# Patient Record
Sex: FEMALE | Race: WHITE | NOT HISPANIC OR LATINO
[De-identification: names, ages, dates, MRNs, and addresses within clinical notes are randomized per-mention and may not be internally consistent; named-entity substitution may affect disease eponyms.]

---

## 2020-03-30 ENCOUNTER — APPOINTMENT (OUTPATIENT)
Dept: RHEUMATOLOGY | Facility: CLINIC | Age: 55
End: 2020-03-30

## 2020-07-10 PROBLEM — Z00.00 ENCOUNTER FOR PREVENTIVE HEALTH EXAMINATION: Status: ACTIVE | Noted: 2020-07-10

## 2020-09-14 ENCOUNTER — LABORATORY RESULT (OUTPATIENT)
Age: 55
End: 2020-09-14

## 2020-09-14 ENCOUNTER — APPOINTMENT (OUTPATIENT)
Dept: RHEUMATOLOGY | Facility: CLINIC | Age: 55
End: 2020-09-14
Payer: COMMERCIAL

## 2020-09-14 VITALS
TEMPERATURE: 98 F | HEIGHT: 63 IN | HEART RATE: 88 BPM | RESPIRATION RATE: 16 BRPM | WEIGHT: 216 LBS | DIASTOLIC BLOOD PRESSURE: 82 MMHG | BODY MASS INDEX: 38.27 KG/M2 | SYSTOLIC BLOOD PRESSURE: 121 MMHG

## 2020-09-14 DIAGNOSIS — Z82.0 FAMILY HISTORY OF EPILEPSY AND OTHER DISEASES OF THE NERVOUS SYSTEM: ICD-10-CM

## 2020-09-14 DIAGNOSIS — Z86.79 PERSONAL HISTORY OF OTHER DISEASES OF THE CIRCULATORY SYSTEM: ICD-10-CM

## 2020-09-14 DIAGNOSIS — M34.9 SYSTEMIC SCLEROSIS, UNSPECIFIED: ICD-10-CM

## 2020-09-14 DIAGNOSIS — Z82.49 FAMILY HISTORY OF ISCHEMIC HEART DISEASE AND OTHER DISEASES OF THE CIRCULATORY SYSTEM: ICD-10-CM

## 2020-09-14 DIAGNOSIS — Z83.2 FAMILY HISTORY OF DISEASES OF THE BLOOD AND BLOOD-FORMING ORGANS AND CERTAIN DISORDERS INVOLVING THE IMMUNE MECHANISM: ICD-10-CM

## 2020-09-14 DIAGNOSIS — Z80.7 FAMILY HISTORY OF OTHER MALIGNANT NEOPLASMS OF LYMPHOID, HEMATOPOIETIC AND RELATED TISSUES: ICD-10-CM

## 2020-09-14 DIAGNOSIS — K57.90 DIVERTICULOSIS OF INTESTINE, PART UNSPECIFIED, W/OUT PERFORATION OR ABSCESS W/OUT BLEEDING: ICD-10-CM

## 2020-09-14 DIAGNOSIS — Z78.9 OTHER SPECIFIED HEALTH STATUS: ICD-10-CM

## 2020-09-14 DIAGNOSIS — Z83.3 FAMILY HISTORY OF DIABETES MELLITUS: ICD-10-CM

## 2020-09-14 DIAGNOSIS — L02.214 CUTANEOUS ABSCESS OF GROIN: ICD-10-CM

## 2020-09-14 PROCEDURE — 99244 OFF/OP CNSLTJ NEW/EST MOD 40: CPT

## 2020-09-14 RX ORDER — BACILLUS COAGULANS/INULIN 1B-250 MG
CAPSULE ORAL
Refills: 0 | Status: ACTIVE | COMMUNITY

## 2020-09-14 RX ORDER — MULTIVITAMIN
TABLET ORAL
Refills: 0 | Status: ACTIVE | COMMUNITY

## 2020-09-14 RX ORDER — OMEGA-3/DHA/EPA/FISH OIL 300-1000MG
CAPSULE ORAL
Refills: 0 | Status: ACTIVE | COMMUNITY

## 2020-09-14 RX ORDER — CHOLECALCIFEROL (VITAMIN D3) 25 MCG
TABLET ORAL
Refills: 0 | Status: ACTIVE | COMMUNITY

## 2020-09-14 RX ORDER — VIT C/HESPERIDIN/BIOFLAVONOIDS 500-100 MG
30 TABLET ORAL
Refills: 0 | Status: ACTIVE | COMMUNITY

## 2020-09-14 RX ORDER — ASCORBIC ACID 500 MG
TABLET ORAL
Refills: 0 | Status: ACTIVE | COMMUNITY

## 2020-09-15 ENCOUNTER — LABORATORY RESULT (OUTPATIENT)
Age: 55
End: 2020-09-15

## 2020-09-17 LAB
25(OH)D3 SERPL-MCNC: 53.7 NG/ML
ALBUMIN MFR SERPL ELPH: 57.8 %
ALBUMIN SERPL ELPH-MCNC: 4.6 G/DL
ALBUMIN SERPL-MCNC: 4.2 G/DL
ALBUMIN/GLOB SERPL: 1.4 RATIO
ALP BLD-CCNC: 106 U/L
ALPHA1 GLOB MFR SERPL ELPH: 4 %
ALPHA1 GLOB SERPL ELPH-MCNC: 0.3 G/DL
ALPHA2 GLOB MFR SERPL ELPH: 10.4 %
ALPHA2 GLOB SERPL ELPH-MCNC: 0.8 G/DL
ALT SERPL-CCNC: 24 U/L
ANA PAT FLD IF-IMP: ABNORMAL
ANA SER IF-ACNC: ABNORMAL
ANION GAP SERPL CALC-SCNC: 17 MMOL/L
APPEARANCE: CLEAR
AST SERPL-CCNC: 23 U/L
B-GLOBULIN MFR SERPL ELPH: 11.6 %
B-GLOBULIN SERPL ELPH-MCNC: 0.8 G/DL
BASOPHILS # BLD AUTO: 0.05 K/UL
BASOPHILS NFR BLD AUTO: 0.7 %
BILIRUB SERPL-MCNC: 0.4 MG/DL
BILIRUBIN URINE: NEGATIVE
BLOOD URINE: NEGATIVE
BUN SERPL-MCNC: 17 MG/DL
C3 SERPL-MCNC: 127 MG/DL
C4 SERPL-MCNC: 35 MG/DL
CALCIUM SERPL-MCNC: 9.8 MG/DL
CCP AB SER IA-ACNC: 27 UNITS
CHLORIDE SERPL-SCNC: 105 MMOL/L
CK SERPL-CCNC: 51 U/L
CO2 SERPL-SCNC: 25 MMOL/L
COLOR: YELLOW
CREAT SERPL-MCNC: 0.71 MG/DL
CREAT SPEC-SCNC: 108 MG/DL
CREAT/PROT UR: 0.1 RATIO
DEPRECATED KAPPA LC FREE/LAMBDA SER: 1.15 RATIO
DSDNA AB SER-ACNC: <12 IU/ML
ENA RNP AB SER IA-ACNC: 0.2 AL
ENA SCL70 IGG SER IA-ACNC: <0.2 AL
ENA SM AB SER IA-ACNC: <0.2 AL
ENA SS-A AB SER IA-ACNC: 0.7 AL
ENA SS-B AB SER IA-ACNC: <0.2 AL
EOSINOPHIL # BLD AUTO: 0.23 K/UL
EOSINOPHIL NFR BLD AUTO: 3.3 %
FERRITIN SERPL-MCNC: 42 NG/ML
GAMMA GLOB FLD ELPH-MCNC: 1.2 G/DL
GAMMA GLOB MFR SERPL ELPH: 16.2 %
GLUCOSE QUALITATIVE U: NEGATIVE
GLUCOSE SERPL-MCNC: 87 MG/DL
HAV IGM SER QL: NONREACTIVE
HBV CORE IGM SER QL: NONREACTIVE
HBV SURFACE AG SER QL: NONREACTIVE
HCT VFR BLD CALC: 51.3 %
HCV AB SER QL: ABNORMAL
HCV S/CO RATIO: 2.3 S/CO
HGB BLD-MCNC: 15.2 G/DL
IGA SER QL IEP: 199 MG/DL
IGG SER QL IEP: 1082 MG/DL
IGM SER QL IEP: 111 MG/DL
IMM GRANULOCYTES NFR BLD AUTO: 0.1 %
INTERPRETATION SERPL IEP-IMP: NORMAL
KAPPA LC CSF-MCNC: 1.46 MG/DL
KAPPA LC SERPL-MCNC: 1.68 MG/DL
KETONES URINE: NEGATIVE
LEUKOCYTE ESTERASE URINE: ABNORMAL
LYMPHOCYTES # BLD AUTO: 2.92 K/UL
LYMPHOCYTES NFR BLD AUTO: 41.8 %
M TB IFN-G BLD-IMP: NEGATIVE
MAN DIFF?: NORMAL
MCHC RBC-ENTMCNC: 27.4 PG
MCHC RBC-ENTMCNC: 29.6 GM/DL
MCV RBC AUTO: 92.6 FL
MONOCYTES # BLD AUTO: 0.49 K/UL
MONOCYTES NFR BLD AUTO: 7 %
MPO AB + PR3 PNL SER: NORMAL
NEUTROPHILS # BLD AUTO: 3.28 K/UL
NEUTROPHILS NFR BLD AUTO: 47.1 %
NITRITE URINE: NEGATIVE
PH URINE: 6
PLATELET # BLD AUTO: 307 K/UL
POTASSIUM SERPL-SCNC: 4.4 MMOL/L
PROT SERPL-MCNC: 7.3 G/DL
PROT UR-MCNC: 10 MG/DL
PROTEIN URINE: NEGATIVE
QUANTIFERON TB PLUS MITOGEN MINUS NIL: 6.74 IU/ML
QUANTIFERON TB PLUS NIL: 0.02 IU/ML
QUANTIFERON TB PLUS TB1 MINUS NIL: 0.01 IU/ML
QUANTIFERON TB PLUS TB2 MINUS NIL: 0.01 IU/ML
RBC # BLD: 5.54 M/UL
RBC # FLD: 15.5 %
RF+CCP IGG SER-IMP: ABNORMAL
RHEUMATOID FACT SER QL: 43 IU/ML
SODIUM SERPL-SCNC: 146 MMOL/L
SPECIFIC GRAVITY URINE: 1.02
THYROGLOB AB SERPL-ACNC: <20 IU/ML
THYROPEROXIDASE AB SERPL IA-ACNC: <10 IU/ML
URATE SERPL-MCNC: 4.6 MG/DL
UROBILINOGEN URINE: NORMAL
WBC # FLD AUTO: 6.98 K/UL

## 2020-09-18 LAB — RNA POLYMERASE III IGG: 9 UNITS

## 2020-11-30 ENCOUNTER — TRANSCRIPTION ENCOUNTER (OUTPATIENT)
Age: 55
End: 2020-11-30

## 2020-12-31 ENCOUNTER — TRANSCRIPTION ENCOUNTER (OUTPATIENT)
Age: 55
End: 2020-12-31

## 2021-05-03 ENCOUNTER — TRANSCRIPTION ENCOUNTER (OUTPATIENT)
Age: 56
End: 2021-05-03

## 2022-06-14 ENCOUNTER — LABORATORY RESULT (OUTPATIENT)
Age: 57
End: 2022-06-14

## 2022-06-14 ENCOUNTER — APPOINTMENT (OUTPATIENT)
Dept: RHEUMATOLOGY | Facility: CLINIC | Age: 57
End: 2022-06-14

## 2022-06-14 VITALS
TEMPERATURE: 97.5 F | BODY MASS INDEX: 38.98 KG/M2 | SYSTOLIC BLOOD PRESSURE: 140 MMHG | DIASTOLIC BLOOD PRESSURE: 87 MMHG | HEART RATE: 87 BPM | RESPIRATION RATE: 15 BRPM | HEIGHT: 63 IN | WEIGHT: 220 LBS

## 2022-06-14 PROCEDURE — 99214 OFFICE O/P EST MOD 30 MIN: CPT | Mod: GC

## 2022-06-14 RX ORDER — FLUTICASONE PROPIONATE 50 UG/1
50 SPRAY, METERED NASAL
Qty: 16 | Refills: 0 | Status: ACTIVE | COMMUNITY
Start: 2022-03-17

## 2022-06-14 RX ORDER — BROMPHENIRAMINE MALEATE, PSEUDOEPHEDRINE HYDROCHLORIDE, 2; 30; 10 MG/5ML; MG/5ML; MG/5ML
30-2-10 SYRUP ORAL
Qty: 200 | Refills: 0 | Status: ACTIVE | COMMUNITY
Start: 2022-05-27

## 2022-06-14 RX ORDER — DOXYCYCLINE HYCLATE 100 MG/1
100 CAPSULE ORAL
Qty: 14 | Refills: 0 | Status: DISCONTINUED | COMMUNITY
Start: 2022-03-17 | End: 2022-06-14

## 2022-06-14 RX ORDER — MUPIROCIN 20 MG/G
2 OINTMENT TOPICAL
Qty: 22 | Refills: 0 | Status: ACTIVE | COMMUNITY
Start: 2022-03-24

## 2022-06-14 RX ORDER — LATANOPROST/PF 0.005 %
0.01 DROPS OPHTHALMIC (EYE)
Qty: 8 | Refills: 0 | Status: ACTIVE | COMMUNITY
Start: 2022-03-31

## 2022-06-14 RX ORDER — PRAVASTATIN SODIUM 10 MG/1
10 TABLET ORAL
Qty: 90 | Refills: 0 | Status: ACTIVE | COMMUNITY
Start: 2022-05-14

## 2022-06-14 RX ORDER — AMLODIPINE BESYLATE 5 MG/1
TABLET ORAL
Refills: 0 | Status: DISCONTINUED | COMMUNITY
End: 2022-06-14

## 2022-06-23 ENCOUNTER — APPOINTMENT (OUTPATIENT)
Dept: CT IMAGING | Facility: CLINIC | Age: 57
End: 2022-06-23

## 2022-06-23 ENCOUNTER — OUTPATIENT (OUTPATIENT)
Dept: OUTPATIENT SERVICES | Facility: HOSPITAL | Age: 57
LOS: 1 days | End: 2022-06-23
Payer: COMMERCIAL

## 2022-06-23 DIAGNOSIS — M34.9 SYSTEMIC SCLEROSIS, UNSPECIFIED: ICD-10-CM

## 2022-06-23 PROCEDURE — 71250 CT THORAX DX C-: CPT

## 2022-06-23 PROCEDURE — 71250 CT THORAX DX C-: CPT | Mod: 26

## 2022-06-24 LAB
ALBUMIN SERPL ELPH-MCNC: 4.5 G/DL
ALP BLD-CCNC: 107 U/L
ALT SERPL-CCNC: 20 U/L
ANA PAT FLD IF-IMP: ABNORMAL
ANA SER IF-ACNC: ABNORMAL
ANION GAP SERPL CALC-SCNC: 15 MMOL/L
APPEARANCE: CLEAR
AST SERPL-CCNC: 21 U/L
BACTERIA: NEGATIVE
BASOPHILS # BLD AUTO: 0.03 K/UL
BASOPHILS NFR BLD AUTO: 0.5 %
BILIRUB SERPL-MCNC: 0.6 MG/DL
BILIRUBIN URINE: NEGATIVE
BLOOD URINE: NEGATIVE
BUN SERPL-MCNC: 11 MG/DL
C3 SERPL-MCNC: 146 MG/DL
C4 SERPL-MCNC: 39 MG/DL
CALCIUM SERPL-MCNC: 10.2 MG/DL
CCP AB SER IA-ACNC: 17 UNITS
CENTROMERE IGG SER-ACNC: >8 CD:130001892
CHLORIDE SERPL-SCNC: 104 MMOL/L
CO2 SERPL-SCNC: 23 MMOL/L
COLOR: NORMAL
CREAT SERPL-MCNC: 0.71 MG/DL
CREAT SPEC-SCNC: 76 MG/DL
CREAT/PROT UR: 0.1 RATIO
CRP SERPL-MCNC: 4 MG/L
DIRECT COOMBS: NORMAL
DSDNA AB SER-ACNC: 13 IU/ML
EGFR: 100 ML/MIN/1.73M2
ENA RNP AB SER IA-ACNC: 0.2 AL
ENA SCL70 IGG SER IA-ACNC: <0.2 AL
ENA SM AB SER IA-ACNC: <0.2 AL
ENA SS-A AB SER IA-ACNC: 0.7 AL
ENA SS-B AB SER IA-ACNC: <0.2 AL
EOSINOPHIL # BLD AUTO: 0.2 K/UL
EOSINOPHIL NFR BLD AUTO: 3.2 %
ERYTHROCYTE [SEDIMENTATION RATE] IN BLOOD BY WESTERGREN METHOD: 49 MM/HR
GLUCOSE QUALITATIVE U: NEGATIVE
GLUCOSE SERPL-MCNC: 101 MG/DL
HAPTOGLOB SERPL-MCNC: 191 MG/DL
HCT VFR BLD CALC: 50 %
HCV AB SER QL: ABNORMAL
HCV S/CO RATIO: 1.51 S/CO
HGB BLD-MCNC: 15.6 G/DL
HYALINE CASTS: 0 /LPF
IMM GRANULOCYTES NFR BLD AUTO: 0 %
KETONES URINE: NEGATIVE
LDH SERPL-CCNC: 189 U/L
LEUKOCYTE ESTERASE URINE: NEGATIVE
LYMPHOCYTES # BLD AUTO: 2.31 K/UL
LYMPHOCYTES NFR BLD AUTO: 36.7 %
MAN DIFF?: NORMAL
MCHC RBC-ENTMCNC: 27.2 PG
MCHC RBC-ENTMCNC: 31.2 GM/DL
MCV RBC AUTO: 87.1 FL
MICROSCOPIC-UA: NORMAL
MONOCYTES # BLD AUTO: 0.41 K/UL
MONOCYTES NFR BLD AUTO: 6.5 %
NEUTROPHILS # BLD AUTO: 3.34 K/UL
NEUTROPHILS NFR BLD AUTO: 53.1 %
NITRITE URINE: NEGATIVE
PH URINE: 7
PLATELET # BLD AUTO: 252 K/UL
POTASSIUM SERPL-SCNC: 4.6 MMOL/L
PROT SERPL-MCNC: 7.5 G/DL
PROT UR-MCNC: 9 MG/DL
PROTEIN URINE: NEGATIVE
RBC # BLD: 5.74 M/UL
RBC # BLD: 5.74 M/UL
RBC # FLD: 17.4 %
RED BLOOD CELLS URINE: 1 /HPF
RETICS # AUTO: 1.6 %
RETICS AGGREG/RBC NFR: 91.8 K/UL
RF+CCP IGG SER-IMP: NEGATIVE
RHEUMATOID FACT SER QL: 49 IU/ML
RNA POLYMERASE III IGG: 22 UNITS
SODIUM SERPL-SCNC: 142 MMOL/L
SPECIFIC GRAVITY URINE: 1.01
SQUAMOUS EPITHELIAL CELLS: 20 /HPF
UROBILINOGEN URINE: NORMAL
WBC # FLD AUTO: 6.29 K/UL
WHITE BLOOD CELLS URINE: 4 /HPF

## 2022-06-27 ENCOUNTER — TRANSCRIPTION ENCOUNTER (OUTPATIENT)
Age: 57
End: 2022-06-27

## 2022-11-12 ENCOUNTER — OFFICE (OUTPATIENT)
Dept: URBAN - METROPOLITAN AREA CLINIC 93 | Facility: CLINIC | Age: 57
Setting detail: OPHTHALMOLOGY
End: 2022-11-12
Payer: COMMERCIAL

## 2022-11-12 DIAGNOSIS — H47.333: ICD-10-CM

## 2022-11-12 DIAGNOSIS — H53.433: ICD-10-CM

## 2022-11-12 DIAGNOSIS — H35.363: ICD-10-CM

## 2022-11-12 DIAGNOSIS — H50.52: ICD-10-CM

## 2022-11-12 DIAGNOSIS — H25.13: ICD-10-CM

## 2022-11-12 DIAGNOSIS — Z79.899: ICD-10-CM

## 2022-11-12 DIAGNOSIS — H35.033: ICD-10-CM

## 2022-11-12 DIAGNOSIS — H01.001: ICD-10-CM

## 2022-11-12 DIAGNOSIS — H01.004: ICD-10-CM

## 2022-11-12 DIAGNOSIS — H40.053: ICD-10-CM

## 2022-11-12 DIAGNOSIS — H50.21: ICD-10-CM

## 2022-11-12 DIAGNOSIS — H53.2: ICD-10-CM

## 2022-11-12 PROCEDURE — 92201 OPSCPY EXTND RTA DRAW UNI/BI: CPT | Performed by: OPHTHALMOLOGY

## 2022-11-12 PROCEDURE — 92133 CPTRZD OPH DX IMG PST SGM ON: CPT | Performed by: OPHTHALMOLOGY

## 2022-11-12 PROCEDURE — 92014 COMPRE OPH EXAM EST PT 1/>: CPT | Performed by: OPHTHALMOLOGY

## 2022-11-12 PROCEDURE — 92083 EXTENDED VISUAL FIELD XM: CPT | Performed by: OPHTHALMOLOGY

## 2022-11-12 ASSESSMENT — REFRACTION_CURRENTRX
OD_CYLINDER: -1.25
OS_VPRISM_DIRECTION: SV
OD_AXIS: 087
OD_OVR_VA: 20/
OD_VPRISM_DIRECTION: SV
OD_SPHERE: -0.75
OS_OVR_VA: 20/
OS_VPRISM_DIRECTION: SV
OS_AXIS: 065
OD_OVR_VA: 20/
OS_AXIS: 063
OS_SPHERE: +2.25
OD_VPRISM_DIRECTION: SV
OS_CYLINDER: -0.75
OS_OVR_VA: 20/
OD_SPHERE: +2.00
OS_SPHERE: -0.25
OD_AXIS: 083
OD_CYLINDER: -1.25
OS_CYLINDER: -0.75

## 2022-11-12 ASSESSMENT — SPHEQUIV_DERIVED
OD_SPHEQUIV: -0.875
OS_SPHEQUIV: -0.75
OS_SPHEQUIV: -0.375
OS_SPHEQUIV: 0.375
OD_SPHEQUIV: -0.75
OD_SPHEQUIV: 0

## 2022-11-12 ASSESSMENT — KERATOMETRY
OS_K1POWER_DIOPTERS: 45.50
OS_K2POWER_DIOPTERS: 44.50
METHOD_AUTO_MANUAL: AUTO
OD_K2POWER_DIOPTERS: 45.00
OD_K1POWER_DIOPTERS: 46.00
OD_AXISANGLE_DEGREES: 084
OS_AXISANGLE_DEGREES: 060

## 2022-11-12 ASSESSMENT — REFRACTION_MANIFEST
OS_CYLINDER: -1.00
OS_SPHERE: -0.25
OS_ADD: +2.00
OS_ADD: +2.50
OD_AXIS: 85
OD_AXIS: 090
OS_VA1: 20/20
OS_CYLINDER: -1.25
OD_SPHERE: +0.25
OS_SPHERE: +0.25
OD_ADD: +2.00
OD_CYLINDER: -1.25
OS_VA1: 20/20
OD_VA1: 20/25+1
OS_AXIS: 060
OD_VA1: 20/20
OS_AXIS: 70
OD_ADD: +2.50
OU_VA: 20/20-
OD_CYLINDER: -2.00
OD_SPHERE: -0.25

## 2022-11-12 ASSESSMENT — AXIALLENGTH_DERIVED
OD_AL: 23.1576
OS_AL: 23.336
OD_AL: 22.8795
OS_AL: 23.1939
OD_AL: 23.2047
OS_AL: 22.9148

## 2022-11-12 ASSESSMENT — REFRACTION_AUTOREFRACTION
OS_SPHERE: +1.00
OD_AXIS: 081
OD_SPHERE: +1.25
OS_CYLINDER: -1.25
OS_AXIS: 072
OD_CYLINDER: -2.50

## 2022-11-12 ASSESSMENT — VISUAL ACUITY
OD_BCVA: 20/25+1
OS_BCVA: 20/30-2

## 2022-11-12 ASSESSMENT — CONFRONTATIONAL VISUAL FIELD TEST (CVF)
OD_FINDINGS: FULL
OS_FINDINGS: FULL

## 2022-11-12 ASSESSMENT — SUPERFICIAL PUNCTATE KERATITIS (SPK)
OS_SPK: T
OD_SPK: T

## 2022-11-12 ASSESSMENT — LID EXAM ASSESSMENTS
OS_BLEPHARITIS: LUL T
OD_BLEPHARITIS: RUL T

## 2023-07-25 ENCOUNTER — LABORATORY RESULT (OUTPATIENT)
Age: 58
End: 2023-07-25

## 2023-07-25 ENCOUNTER — APPOINTMENT (OUTPATIENT)
Dept: RHEUMATOLOGY | Facility: CLINIC | Age: 58
End: 2023-07-25
Payer: COMMERCIAL

## 2023-07-25 VITALS
OXYGEN SATURATION: 98 % | RESPIRATION RATE: 16 BRPM | HEIGHT: 63 IN | TEMPERATURE: 97.1 F | WEIGHT: 250 LBS | HEART RATE: 98 BPM | SYSTOLIC BLOOD PRESSURE: 143 MMHG | BODY MASS INDEX: 44.3 KG/M2 | DIASTOLIC BLOOD PRESSURE: 86 MMHG

## 2023-07-25 PROCEDURE — 99214 OFFICE O/P EST MOD 30 MIN: CPT

## 2023-07-25 NOTE — PHYSICAL EXAM
[General Appearance - Alert] : alert [General Appearance - In No Acute Distress] : in no acute distress [Sclera] : the sclera and conjunctiva were normal [PERRL With Normal Accommodation] : pupils were equal in size, round, and reactive to light [Extraocular Movements] : extraocular movements were intact [Outer Ear] : the ears and nose were normal in appearance [Oropharynx] : the oropharynx was normal [Auscultation Breath Sounds / Voice Sounds] : lungs were clear to auscultation bilaterally [Heart Rate And Rhythm] : heart rate was normal and rhythm regular [Heart Sounds] : normal S1 and S2 [Heart Sounds Gallop] : no gallops [Murmurs] : no murmurs [Heart Sounds Pericardial Friction Rub] : no pericardial rub [Bowel Sounds] : normal bowel sounds [Abdomen Soft] : soft [Abdomen Tenderness] : non-tender [] : no hepato-splenomegaly [Abdomen Mass (___ Cm)] : no abdominal mass palpated [Cervical Lymph Nodes Enlarged Posterior Bilaterally] : posterior cervical [Cervical Lymph Nodes Enlarged Anterior Bilaterally] : anterior cervical [Supraclavicular Lymph Nodes Enlarged Bilaterally] : supraclavicular [Axillary Lymph Nodes Enlarged Bilaterally] : axillary [FreeTextEntry1] : Right 2nd finger digital tip ulcer has healed. 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 Telangiectasis in lower lip.

## 2023-07-25 NOTE — ASSESSMENT
[FreeTextEntry1] : 58 year old female with limited systemic sclerosis here for follow up. \par \par 1. Limited systemic sclerosis: limited skin tightening, arthralgias/myopathy, Raynaud's with digital ulcer, GERD\par i) Skin: has limited skin tightening (sclerodactyly) that is relatively stable. Monitor for now. Raynaud's reasonably controlled with Nifedipine 60 mg daily. Using Nitro bid sparingly. \par ii) Pulmonary: CT chest last year w/o evidence of ILD. Has appt. with pulm. Will do PFTs then.   \par iii) Cardiac: ECHO was recently done with Cardiologist at [place], Has f/u appointment scheduled. Advised to bring records.. \par iv) MSK: Known OA of the knees and feet. No joint symptoms suggestive of RA although has pos RF and +/- CCP. Discussed possible Plaquenil trial but patient wants to wait for now. \par v) GI: no active issues \par vi) Renal: No h/o renal involvement.  \par vii) Heme: no active issues. \par viii) Neuro: no active issues\par iX) Vascular: h/o venus insufficiency. Follows with vascular surgery at [place]\par \par 2. Bone health: DEXA to be ordered at next visit - discussed with patient. \par \par Follow up in 3 months \par \par

## 2023-07-25 NOTE — REVIEW OF SYSTEMS
[Eyesight Problems] : eyesight problems [Fever] : no fever [Chills] : no chills [Earache] : no earache [Shortness Of Breath] : no shortness of breath [Abdominal Pain] : no abdominal pain [Vomiting] : no vomiting [Diarrhea] : no diarrhea [Joint Pain] : no joint pain [Joint Stiffness] : no joint stiffness [Muscle Weakness] : no muscle weakness [Swollen Glands] : no swollen glands

## 2023-07-25 NOTE — HISTORY OF PRESENT ILLNESS
[___ Month(s) Ago] : [unfilled] month(s) ago [Currently Experiencing] : currently [Fatigue] : fatigue [Dry Mouth] : dry mouth [Arthralgias] : arthralgias [Decreased ROM] : decreased range of motion [Myalgias] : myalgias [FreeTextEntry1] : C/o ongoing fatigue and arthralgias in the knees and feet. Also sicca symptoms. No cough, SOB. L LE swelling on and off due to venous insufficiency.\par \par No new digital ulcers. Raynaud's did improve with Nifedipine but still notices some discoloration of the fingers with cold exposure.  [Anorexia] : no anorexia [Weight Loss] : no weight loss [Malaise] : no malaise [Fever] : no fever [Chills] : no chills [Depression] : no depression [Malar Facial Rash] : no malar facial rash [Skin Lesions] : no lesions [Skin Nodules] : no skin nodules [Oral Ulcers] : no oral ulcers [Cough] : no cough [Dysphonia] : no dysphonia [Dysphagia] : no dysphagia [Shortness of Breath] : no shortness of breath [Chest Pain] : no chest pain [Difficulty Standing] : no difficulty standing [Difficulty Walking] : no difficulty walking [Dyspnea] : no dyspnea [Muscle Weakness] : no muscle weakness [Muscle Spasms] : no muscle spasms [Muscle Cramping] : no muscle cramping [Visual Changes] : no visual changes [Eye Pain] : no eye pain [Eye Redness] : no eye redness [Dry Eyes] : no dry eyes

## 2023-08-04 LAB
ALBUMIN SERPL ELPH-MCNC: 4.6 G/DL
ALP BLD-CCNC: 102 U/L
ALT SERPL-CCNC: 32 U/L
ANION GAP SERPL CALC-SCNC: 18 MMOL/L
APPEARANCE: CLEAR
AST SERPL-CCNC: 35 U/L
BILIRUB SERPL-MCNC: 0.6 MG/DL
BILIRUBIN URINE: NEGATIVE
BLOOD URINE: NEGATIVE
BUN SERPL-MCNC: 15 MG/DL
CALCIUM SERPL-MCNC: 9.8 MG/DL
CHLORIDE SERPL-SCNC: 105 MMOL/L
CK SERPL-CCNC: 69 U/L
CO2 SERPL-SCNC: 22 MMOL/L
COLOR: NORMAL
CREAT SERPL-MCNC: 0.81 MG/DL
CREAT SPEC-SCNC: 145 MG/DL
CREAT/PROT UR: 0.1 RATIO
CRP SERPL-MCNC: 4 MG/L
DEPRECATED KAPPA LC FREE/LAMBDA SER: 1.18 RATIO
EGFR: 85 ML/MIN/1.73M2
ERYTHROCYTE [SEDIMENTATION RATE] IN BLOOD BY WESTERGREN METHOD: 41 MM/HR
GLUCOSE QUALITATIVE U: NEGATIVE MG/DL
GLUCOSE SERPL-MCNC: 100 MG/DL
IGA SER QL IEP: 235 MG/DL
IGG SER QL IEP: 1085 MG/DL
IGM SER QL IEP: 109 MG/DL
KAPPA LC CSF-MCNC: 1.85 MG/DL
KAPPA LC SERPL-MCNC: 2.18 MG/DL
KETONES URINE: NEGATIVE MG/DL
LEUKOCYTE ESTERASE URINE: ABNORMAL
NITRITE URINE: NEGATIVE
PH URINE: 6
POTASSIUM SERPL-SCNC: 4.7 MMOL/L
PROT SERPL-MCNC: 7.2 G/DL
PROT UR-MCNC: 16 MG/DL
PROTEIN URINE: NEGATIVE MG/DL
RHEUMATOID FACT SER QL: 33 IU/ML
SODIUM SERPL-SCNC: 145 MMOL/L
SPECIFIC GRAVITY URINE: 1.02
TSH SERPL-ACNC: 4.3 UIU/ML
UROBILINOGEN URINE: 0.2 MG/DL

## 2023-09-29 ENCOUNTER — OFFICE (OUTPATIENT)
Dept: URBAN - METROPOLITAN AREA CLINIC 12 | Facility: CLINIC | Age: 58
Setting detail: OPHTHALMOLOGY
End: 2023-09-29
Payer: COMMERCIAL

## 2023-09-29 DIAGNOSIS — H50.21: ICD-10-CM

## 2023-09-29 DIAGNOSIS — H47.333: ICD-10-CM

## 2023-09-29 DIAGNOSIS — H50.52: ICD-10-CM

## 2023-09-29 DIAGNOSIS — H53.2: ICD-10-CM

## 2023-09-29 DIAGNOSIS — H25.13: ICD-10-CM

## 2023-09-29 DIAGNOSIS — H16.223: ICD-10-CM

## 2023-09-29 DIAGNOSIS — H01.001: ICD-10-CM

## 2023-09-29 DIAGNOSIS — Z79.899: ICD-10-CM

## 2023-09-29 DIAGNOSIS — H40.053: ICD-10-CM

## 2023-09-29 DIAGNOSIS — H01.004: ICD-10-CM

## 2023-09-29 DIAGNOSIS — H53.433: ICD-10-CM

## 2023-09-29 PROCEDURE — 99214 OFFICE O/P EST MOD 30 MIN: CPT | Performed by: STUDENT IN AN ORGANIZED HEALTH CARE EDUCATION/TRAINING PROGRAM

## 2023-09-29 PROCEDURE — 92020 GONIOSCOPY: CPT | Performed by: STUDENT IN AN ORGANIZED HEALTH CARE EDUCATION/TRAINING PROGRAM

## 2023-09-29 PROCEDURE — 92083 EXTENDED VISUAL FIELD XM: CPT | Performed by: STUDENT IN AN ORGANIZED HEALTH CARE EDUCATION/TRAINING PROGRAM

## 2023-09-29 PROCEDURE — 92133 CPTRZD OPH DX IMG PST SGM ON: CPT | Performed by: STUDENT IN AN ORGANIZED HEALTH CARE EDUCATION/TRAINING PROGRAM

## 2023-09-29 ASSESSMENT — REFRACTION_MANIFEST
OD_SPHERE: -0.25
OD_VA1: 20/20
OS_CYLINDER: -1.25
OS_SPHERE: +0.25
OS_ADD: +2.50
OS_AXIS: 060
OD_VA1: 20/25+1
OS_CYLINDER: -1.00
OS_VA1: 20/20
OD_CYLINDER: -2.00
OD_AXIS: 090
OS_AXIS: 70
OU_VA: 20/20-
OD_ADD: +2.00
OS_VA1: 20/20
OS_ADD: +2.00
OD_SPHERE: +0.25
OD_ADD: +2.50
OD_CYLINDER: -1.25
OD_AXIS: 85
OS_SPHERE: -0.25

## 2023-09-29 ASSESSMENT — SUPERFICIAL PUNCTATE KERATITIS (SPK)
OD_SPK: T
OS_SPK: T

## 2023-09-29 ASSESSMENT — REFRACTION_CURRENTRX
OD_SPHERE: -0.50
OD_AXIS: 090
OD_VPRISM_DIRECTION: SV
OS_CYLINDER: -1.00
OS_VPRISM_DIRECTION: SV
OS_OVR_VA: 20/
OS_AXIS: 062
OS_CYLINDER: -0.75
OS_OVR_VA: 20/
OD_CYLINDER: -1.25
OD_OVR_VA: 20/
OS_SPHERE: PLANO
OD_OVR_VA: 20/
OD_AXIS: 083
OD_SPHERE: +2.00
OS_AXIS: 059
OD_VPRISM_DIRECTION: SV
OD_CYLINDER: -1.25
OS_SPHERE: +2.25
OS_VPRISM_DIRECTION: SV

## 2023-09-29 ASSESSMENT — CONFRONTATIONAL VISUAL FIELD TEST (CVF)
OS_FINDINGS: FULL
OD_FINDINGS: FULL

## 2023-09-29 ASSESSMENT — AXIALLENGTH_DERIVED
OD_AL: 23.1001
OD_AL: 23.336
OS_AL: 23.202
OD_AL: 23.3837
OS_AL: 22.9227
OS_AL: 23.0615

## 2023-09-29 ASSESSMENT — SPHEQUIV_DERIVED
OD_SPHEQUIV: -0.875
OS_SPHEQUIV: -0.375
OD_SPHEQUIV: -0.75
OS_SPHEQUIV: -0.75
OD_SPHEQUIV: -0.125
OS_SPHEQUIV: 0

## 2023-09-29 ASSESSMENT — KERATOMETRY
OD_K1POWER_DIOPTERS: 44.25
OS_K1POWER_DIOPTERS: 45.25
OD_AXISANGLE_DEGREES: 178
METHOD_AUTO_MANUAL: AUTO
OS_AXISANGLE_DEGREES: 155
OS_K2POWER_DIOPTERS: 45.50
OD_K2POWER_DIOPTERS: 45.75

## 2023-09-29 ASSESSMENT — REFRACTION_AUTOREFRACTION
OS_SPHERE: +0.50
OD_SPHERE: +1.00
OD_CYLINDER: -2.25
OS_AXIS: 069
OS_CYLINDER: -1.00
OD_AXIS: 083

## 2023-09-29 ASSESSMENT — LID EXAM ASSESSMENTS
OS_BLEPHARITIS: LUL T
OD_BLEPHARITIS: RUL T

## 2023-09-29 ASSESSMENT — PACHYMETRY
OD_CT_UM: 559
OS_CT_UM: 552
OS_CT_CORRECTION: -1
OD_CT_CORRECTION: -1

## 2023-09-29 ASSESSMENT — VISUAL ACUITY
OD_BCVA: 20/20
OS_BCVA: 20/30-2

## 2023-11-28 ENCOUNTER — APPOINTMENT (OUTPATIENT)
Dept: PULMONOLOGY | Facility: CLINIC | Age: 58
End: 2023-11-28

## 2023-12-12 ENCOUNTER — APPOINTMENT (OUTPATIENT)
Dept: RHEUMATOLOGY | Facility: CLINIC | Age: 58
End: 2023-12-12

## 2024-01-10 ENCOUNTER — APPOINTMENT (OUTPATIENT)
Dept: RHEUMATOLOGY | Facility: CLINIC | Age: 59
End: 2024-01-10
Payer: COMMERCIAL

## 2024-01-10 VITALS
HEIGHT: 63 IN | OXYGEN SATURATION: 98 % | HEART RATE: 93 BPM | SYSTOLIC BLOOD PRESSURE: 164 MMHG | TEMPERATURE: 98.1 F | WEIGHT: 208 LBS | RESPIRATION RATE: 16 BRPM | DIASTOLIC BLOOD PRESSURE: 96 MMHG | BODY MASS INDEX: 36.86 KG/M2

## 2024-01-10 PROCEDURE — 99214 OFFICE O/P EST MOD 30 MIN: CPT | Mod: GC

## 2024-01-10 RX ORDER — ASPIRIN ENTERIC COATED TABLETS 81 MG 81 MG/1
81 TABLET, DELAYED RELEASE ORAL DAILY
Qty: 90 | Refills: 0 | Status: ACTIVE | COMMUNITY
Start: 2024-01-10 | End: 1900-01-01

## 2024-01-10 RX ORDER — NIFEDIPINE 60 MG/1
60 TABLET, EXTENDED RELEASE ORAL DAILY
Qty: 90 | Refills: 2 | Status: COMPLETED | COMMUNITY
Start: 2022-06-09 | End: 2024-01-10

## 2024-01-12 NOTE — ASSESSMENT
[FreeTextEntry1] : 58 year old female with limited systemic sclerosis here for follow up.  1. Limited systemic sclerosis: limited skin tightening, arthralgias/myopathy, Raynaud's with digital ulcer, GERD i) Skin: has limited skin tightening (sclerodactyly) that is relatively stable. Monitor for now. Raynaud's reasonably controlled with Nifedipine 60 mg daily. Using Nitro bid sparingly. ii) Pulmonary: CT chest last year w/o evidence of ILD. Has appt. with pulm next month. Will do PFTs then. iii) Cardiac: ECHO in 2022 unremarkable. Has appointment with Cardiologist next week. Advise to bring TTE.  warren) MSK: Known OA of the knees and feet. No joint symptoms suggestive of RA although has pos RF and +/- CCP. Discussed possible Plaquenil trial but patient wants to wait for now. v) GI: no active issues vi) Renal: No h/o renal involvement. vii) Heme: no active issues. viii) Neuro: no active issues ix) Vascular: h/o venous insufficiency in lower extremities. 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  - Will repeat Arterial and Venous Duplex US of right upper extremity  - Start ASA 81 mg daily and Nitro-bid for now  - Increase Nifedipine to 90 mg daily  - Will refer to Vascular surgery after US  - Will consider Sildenafil if she has persistent vasospasm   2. Bone health: DEXA to be ordered at next visit - discussed with patient.  Follow up in 6-8 weeks  D/w Dr. Taryn Reina MD Rheumatology Fellow PGY-4

## 2024-01-12 NOTE — HISTORY OF PRESENT ILLNESS
[___ Month(s) Ago] : [unfilled] month(s) ago [Currently Experiencing] : currently [Fatigue] : fatigue [Dry Mouth] : dry mouth [Arthralgias] : arthralgias [Decreased ROM] : decreased range of motion [Myalgias] : myalgias [de-identified] : 7/23 [FreeTextEntry1] : Patient presented for follow up Reports, she started having pain with white/blue color changes in her right hand last week, went to [facility] where she had upper limb US which showed middle brachial artery 50-99% occlusion. She was prescribed Motrin and Tylenol. Reports she still feels cold and pain in her right hand and reports she is not at her baseline.  Denies fever, skin rash, joint pain, shortness of breath, chest pain.   [Anorexia] : no anorexia [Weight Loss] : no weight loss [Malaise] : no malaise [Fever] : no fever [Chills] : no chills [Depression] : no depression [Malar Facial Rash] : no malar facial rash [Skin Lesions] : no lesions [Skin Nodules] : no skin nodules [Oral Ulcers] : no oral ulcers [Cough] : no cough [Dysphonia] : no dysphonia [Dysphagia] : no dysphagia [Shortness of Breath] : no shortness of breath [Chest Pain] : no chest pain [Difficulty Standing] : no difficulty standing [Difficulty Walking] : no difficulty walking [Dyspnea] : no dyspnea [Muscle Weakness] : no muscle weakness [Muscle Spasms] : no muscle spasms [Muscle Cramping] : no muscle cramping [Visual Changes] : no visual changes [Eye Pain] : no eye pain [Eye Redness] : no eye redness [Dry Eyes] : no dry eyes

## 2024-01-19 ENCOUNTER — APPOINTMENT (OUTPATIENT)
Dept: ULTRASOUND IMAGING | Facility: CLINIC | Age: 59
End: 2024-01-19
Payer: COMMERCIAL

## 2024-01-19 ENCOUNTER — OUTPATIENT (OUTPATIENT)
Dept: OUTPATIENT SERVICES | Facility: HOSPITAL | Age: 59
LOS: 1 days | End: 2024-01-19

## 2024-01-19 DIAGNOSIS — I99.8 OTHER DISORDER OF CIRCULATORY SYSTEM: ICD-10-CM

## 2024-01-19 PROCEDURE — 93931 UPPER EXTREMITY STUDY: CPT | Mod: 26,RT

## 2024-01-19 PROCEDURE — 93971 EXTREMITY STUDY: CPT | Mod: 26,RT

## 2024-01-24 DIAGNOSIS — I99.8 OTHER DISORDER OF CIRCULATORY SYSTEM: ICD-10-CM

## 2024-02-06 ENCOUNTER — APPOINTMENT (OUTPATIENT)
Dept: RHEUMATOLOGY | Facility: CLINIC | Age: 59
End: 2024-02-06
Payer: COMMERCIAL

## 2024-02-06 VITALS
OXYGEN SATURATION: 98 % | TEMPERATURE: 98.1 F | HEART RATE: 81 BPM | WEIGHT: 207 LBS | HEIGHT: 63 IN | RESPIRATION RATE: 16 BRPM | BODY MASS INDEX: 36.68 KG/M2 | DIASTOLIC BLOOD PRESSURE: 91 MMHG | SYSTOLIC BLOOD PRESSURE: 163 MMHG

## 2024-02-06 DIAGNOSIS — L98.499 NON-PRESSURE CHRONIC ULCER OF SKIN OF OTHER SITES WITH UNSPECIFIED SEVERITY: ICD-10-CM

## 2024-02-06 PROCEDURE — 99214 OFFICE O/P EST MOD 30 MIN: CPT

## 2024-02-06 RX ORDER — TRAMADOL HYDROCHLORIDE 50 MG/1
50 TABLET, COATED ORAL
Qty: 30 | Refills: 0 | Status: ACTIVE | COMMUNITY
Start: 2024-02-06 | End: 1900-01-01

## 2024-02-06 NOTE — DATA REVIEWED
[FreeTextEntry1] : Prior imaging studies reviewed and scanned into All [facility]   Arterial Duplex (12/2023): No significant arterial disease in the right arm

## 2024-02-06 NOTE — HISTORY OF PRESENT ILLNESS
[___ Month(s) Ago] : [unfilled] month(s) ago [Currently Experiencing] : currently [Fatigue] : fatigue [Dry Mouth] : dry mouth [Arthralgias] : arthralgias [Decreased ROM] : decreased range of motion [Myalgias] : myalgias [FreeTextEntry1] : Recently admitted to [facility] for pain in the hand with necrotic ulcer over the digit. US showed lack of blood supply to the hand [Anorexia] : no anorexia [Weight Loss] : no weight loss [Malaise] : no malaise [Fever] : no fever [Chills] : no chills [Depression] : no depression [Malar Facial Rash] : no malar facial rash [Skin Lesions] : no lesions [Skin Nodules] : no skin nodules [Oral Ulcers] : no oral ulcers [Cough] : no cough [Dysphonia] : no dysphonia [Dysphagia] : no dysphagia [Shortness of Breath] : no shortness of breath [Chest Pain] : no chest pain [Joint Swelling] : no joint swelling [Joint Warmth] : no joint warmth [Joint Deformity] : no joint deformity [Morning Stiffness] : no morning stiffness [Falls] : no falls [Difficulty Standing] : no difficulty standing [Difficulty Walking] : no difficulty walking [Dyspnea] : no dyspnea [Muscle Weakness] : no muscle weakness [Muscle Spasms] : no muscle spasms [Muscle Cramping] : no muscle cramping [Visual Changes] : no visual changes [Eye Pain] : no eye pain [Eye Redness] : no eye redness [Dry Eyes] : no dry eyes

## 2024-02-06 NOTE — PHYSICAL EXAM
[General Appearance - Alert] : alert [General Appearance - In No Acute Distress] : in no acute distress [Sclera] : the sclera and conjunctiva were normal [PERRL With Normal Accommodation] : pupils were equal in size, round, and reactive to light [Extraocular Movements] : extraocular movements were intact [Outer Ear] : the ears and nose were normal in appearance [Oropharynx] : the oropharynx was normal [Auscultation Breath Sounds / Voice Sounds] : lungs were clear to auscultation bilaterally [Heart Rate And Rhythm] : heart rate was normal and rhythm regular [Heart Sounds] : normal S1 and S2 [Heart Sounds Gallop] : no gallops [Murmurs] : no murmurs [Heart Sounds Pericardial Friction Rub] : no pericardial rub [Bowel Sounds] : normal bowel sounds [Abdomen Soft] : soft [Abdomen Tenderness] : non-tender [] : no hepato-splenomegaly [Abdomen Mass (___ Cm)] : no abdominal mass palpated [Cervical Lymph Nodes Enlarged Posterior Bilaterally] : posterior cervical [Cervical Lymph Nodes Enlarged Anterior Bilaterally] : anterior cervical [Supraclavicular Lymph Nodes Enlarged Bilaterally] : supraclavicular [Axillary Lymph Nodes Enlarged Bilaterally] : axillary [FreeTextEntry1] : Necrotic ulcer on the tip of the 2nd digit and a new area of necrosis on the tip of the R 4th digit. 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 Telangiectasis in lower lip.

## 2024-02-06 NOTE — ASSESSMENT
[FreeTextEntry1] : 59 year old female with limited systemic sclerosis here for follow up.  1. Limited systemic sclerosis: limited skin tightening, arthralgias/myopathy, Raynaud's with digital ulcer, GERD i) Skin: has limited skin tightening (sclerodactyly) that is relatively stable. Painful necrotic digital ulcers in the R hand - currently on Nifedipine ER 90 mg daily + Xarelto BID + ASA 81 mg daily + Nitro bid ointment 3 times daily. Will try to get approval for Sildenafil. Consider nerve block with hand surgery for pain. Trial of Tramadol 25 mg BID. Bacitracin and Lidocaine ointment for ulcer. Continue Nitro-Bind for now and discontinue if starting Sildenafil.  ii) Pulmonary: CT chest last year w/o evidence of ILD. PFTs pending.  iii) Cardiac: ECHO in 2022 unremarkable. Follow up recent TTE.  warren) MSK: Known OA of the knees and feet. No joint symptoms suggestive of RA although has pos RF and +/- CCP.  v) GI: no active issues vi) Renal: No h/o renal involvement. vii) Heme: no active issues. viii) Neuro: no active issues ix) Vascular: h/o venous insufficiency in lower extremities. Follows with vascular surgery at [place]  2. Bone health: DEXA to be ordered at next visit - discussed with patient.  Follow up in 1 week

## 2024-02-13 ENCOUNTER — NON-APPOINTMENT (OUTPATIENT)
Age: 59
End: 2024-02-13

## 2024-02-14 ENCOUNTER — APPOINTMENT (OUTPATIENT)
Dept: RHEUMATOLOGY | Facility: CLINIC | Age: 59
End: 2024-02-14
Payer: COMMERCIAL

## 2024-02-14 VITALS
SYSTOLIC BLOOD PRESSURE: 140 MMHG | HEART RATE: 85 BPM | WEIGHT: 207 LBS | DIASTOLIC BLOOD PRESSURE: 87 MMHG | BODY MASS INDEX: 36.68 KG/M2 | OXYGEN SATURATION: 90 % | HEIGHT: 63 IN | RESPIRATION RATE: 15 BRPM

## 2024-02-14 VITALS
HEIGHT: 61 IN | DIASTOLIC BLOOD PRESSURE: 80 MMHG | OXYGEN SATURATION: 95 % | HEART RATE: 84 BPM | RESPIRATION RATE: 15 BRPM | SYSTOLIC BLOOD PRESSURE: 127 MMHG | WEIGHT: 120 LBS | BODY MASS INDEX: 22.66 KG/M2

## 2024-02-14 VITALS
BODY MASS INDEX: 18.98 KG/M2 | WEIGHT: 103.13 LBS | SYSTOLIC BLOOD PRESSURE: 168 MMHG | DIASTOLIC BLOOD PRESSURE: 96 MMHG | HEART RATE: 83 BPM | RESPIRATION RATE: 15 BRPM | HEIGHT: 62 IN | OXYGEN SATURATION: 98 %

## 2024-02-14 PROCEDURE — 99214 OFFICE O/P EST MOD 30 MIN: CPT

## 2024-02-14 NOTE — ASSESSMENT
[FreeTextEntry1] : 59 year old female with limited systemic sclerosis here for follow up.  1. Limited systemic sclerosis: limited skin tightening, arthralgias/myopathy, Raynaud's with digital ulcer, GERD i) Skin: has limited skin tightening (sclerodactyly) that is relatively stable. Painful necrotic digital ulcers in the R hand - currently on Nifedipine ER 90 mg daily + Xarelto BID + ASA 81 mg daily + Sildenafil 20 mg 3 times daily. Continue Tramadol 25 mg BID and Bacitracin and Lidocaine ointment for ulcer.  ii) Pulmonary: CT chest last year w/o evidence of ILD. PFTs pending.  iii) Cardiac: ECHO in 2022 unremarkable. Follow up recent TTE.  warren) MSK: Known OA of the knees and feet. No joint symptoms suggestive of RA although has pos RF and +/- CCP.  v) GI: no active issues vi) Renal: No h/o renal involvement. vii) Heme: no active issues. viii) Neuro: no active issues ix) Vascular: h/o venous insufficiency in lower extremities. Follows with vascular surgery at [place]  2. Bone health: DEXA to be ordered at next visit - discussed with patient.  Follow up in 2 weeks

## 2024-02-14 NOTE — HISTORY OF PRESENT ILLNESS
[___ Week(s) Ago] : [unfilled] week(s) ago [FreeTextEntry1] : Started Sildenafil 20 mg TID last week. Was in [place] for swelling and redness in the R middle finger - started on Clindamycin. Tramadol helping with the pain.  [Currently Experiencing] : currently [Anorexia] : no anorexia [Weight Loss] : no weight loss [Malaise] : no malaise [Fever] : no fever [Chills] : no chills [Fatigue] : fatigue [Depression] : no depression [Malar Facial Rash] : no malar facial rash [Skin Lesions] : no lesions [Skin Nodules] : no skin nodules [Oral Ulcers] : no oral ulcers [Cough] : no cough [Dry Mouth] : dry mouth [Dysphonia] : no dysphonia [Dysphagia] : no dysphagia [Shortness of Breath] : no shortness of breath [Chest Pain] : no chest pain [Arthralgias] : arthralgias [Joint Swelling] : no joint swelling [Joint Warmth] : no joint warmth [Joint Deformity] : no joint deformity [Decreased ROM] : decreased range of motion [Morning Stiffness] : no morning stiffness [Falls] : no falls [Difficulty Standing] : no difficulty standing [Difficulty Walking] : no difficulty walking [Dyspnea] : no dyspnea [Myalgias] : myalgias [Muscle Weakness] : no muscle weakness [Muscle Spasms] : no muscle spasms [Muscle Cramping] : no muscle cramping [Visual Changes] : no visual changes [Eye Pain] : no eye pain [Eye Redness] : no eye redness [Dry Eyes] : no dry eyes

## 2024-02-14 NOTE — PHYSICAL EXAM
[General Appearance - Alert] : alert [General Appearance - In No Acute Distress] : in no acute distress [Sclera] : the sclera and conjunctiva were normal [PERRL With Normal Accommodation] : pupils were equal in size, round, and reactive to light [Extraocular Movements] : extraocular movements were intact [Outer Ear] : the ears and nose were normal in appearance [Oropharynx] : the oropharynx was normal [Auscultation Breath Sounds / Voice Sounds] : lungs were clear to auscultation bilaterally [Heart Rate And Rhythm] : heart rate was normal and rhythm regular [Heart Sounds] : normal S1 and S2 [Heart Sounds Gallop] : no gallops [Murmurs] : no murmurs [Heart Sounds Pericardial Friction Rub] : no pericardial rub [Bowel Sounds] : normal bowel sounds [Abdomen Soft] : soft [Abdomen Tenderness] : non-tender [] : no hepato-splenomegaly [Abdomen Mass (___ Cm)] : no abdominal mass palpated [Cervical Lymph Nodes Enlarged Posterior Bilaterally] : posterior cervical [Cervical Lymph Nodes Enlarged Anterior Bilaterally] : anterior cervical [Supraclavicular Lymph Nodes Enlarged Bilaterally] : supraclavicular [Axillary Lymph Nodes Enlarged Bilaterally] : axillary [FreeTextEntry1] : Necrotic ulcer on the tip of the 2nd digit and a new area of necrosis on the tip of the R 4th digit - evolving. 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 Telangiectasis in lower lip.

## 2024-02-14 NOTE — DATA REVIEWED
[FreeTextEntry1] : Prior imaging studies reviewed and scanned into All [facility]   Arterial Duplex (12/2023): No significant arterial disease in the right arm

## 2024-02-14 NOTE — REVIEW OF SYSTEMS
[Fever] : no fever [Chills] : no chills [Eyesight Problems] : eyesight problems [Earache] : no earache [Shortness Of Breath] : no shortness of breath [Abdominal Pain] : no abdominal pain [Vomiting] : no vomiting [Diarrhea] : no diarrhea [Joint Pain] : no joint pain [Joint Stiffness] : no joint stiffness [Muscle Weakness] : no muscle weakness [Swollen Glands] : no swollen glands

## 2024-02-26 ENCOUNTER — APPOINTMENT (OUTPATIENT)
Dept: RHEUMATOLOGY | Facility: CLINIC | Age: 59
End: 2024-02-26
Payer: COMMERCIAL

## 2024-02-26 PROCEDURE — 99442: CPT

## 2024-02-26 RX ORDER — NITROGLYCERIN 20 MG/G
2 OINTMENT TOPICAL
Qty: 1 | Refills: 2 | Status: DISCONTINUED | COMMUNITY
Start: 2022-06-14 | End: 2024-02-26

## 2024-02-26 RX ORDER — OSELTAMIVIR PHOSPHATE 75 MG/1
75 CAPSULE ORAL
Qty: 10 | Refills: 0 | Status: DISCONTINUED | COMMUNITY
Start: 2022-01-26 | End: 2024-02-26

## 2024-03-25 ENCOUNTER — APPOINTMENT (OUTPATIENT)
Dept: PULMONOLOGY | Facility: CLINIC | Age: 59
End: 2024-03-25

## 2024-03-25 ENCOUNTER — RX RENEWAL (OUTPATIENT)
Age: 59
End: 2024-03-25

## 2024-03-25 RX ORDER — NIFEDIPINE 60 MG/1
60 TABLET, FILM COATED, EXTENDED RELEASE ORAL
Qty: 90 | Refills: 0 | Status: ACTIVE | COMMUNITY
Start: 2024-03-25 | End: 1900-01-01

## 2024-03-27 ENCOUNTER — RX RENEWAL (OUTPATIENT)
Age: 59
End: 2024-03-27

## 2024-04-01 ENCOUNTER — OFFICE (OUTPATIENT)
Dept: URBAN - METROPOLITAN AREA CLINIC 12 | Facility: CLINIC | Age: 59
Setting detail: OPHTHALMOLOGY
End: 2024-04-01
Payer: COMMERCIAL

## 2024-04-01 DIAGNOSIS — H43.812: ICD-10-CM

## 2024-04-01 PROCEDURE — 92134 CPTRZ OPH DX IMG PST SGM RTA: CPT | Performed by: STUDENT IN AN ORGANIZED HEALTH CARE EDUCATION/TRAINING PROGRAM

## 2024-04-01 PROCEDURE — 92014 COMPRE OPH EXAM EST PT 1/>: CPT | Performed by: STUDENT IN AN ORGANIZED HEALTH CARE EDUCATION/TRAINING PROGRAM

## 2024-04-01 ASSESSMENT — LID EXAM ASSESSMENTS
OD_BLEPHARITIS: RUL T
OS_BLEPHARITIS: LUL T

## 2024-04-02 ENCOUNTER — RX ONLY (RX ONLY)
Age: 59
End: 2024-04-02

## 2024-04-04 ENCOUNTER — RX RENEWAL (OUTPATIENT)
Age: 59
End: 2024-04-04

## 2024-04-15 ENCOUNTER — APPOINTMENT (OUTPATIENT)
Dept: RHEUMATOLOGY | Facility: CLINIC | Age: 59
End: 2024-04-15
Payer: COMMERCIAL

## 2024-04-15 PROCEDURE — 99442: CPT

## 2024-05-02 ENCOUNTER — RX RENEWAL (OUTPATIENT)
Age: 59
End: 2024-05-02

## 2024-06-03 ENCOUNTER — RX RENEWAL (OUTPATIENT)
Age: 59
End: 2024-06-03

## 2024-06-21 ENCOUNTER — RX RENEWAL (OUTPATIENT)
Age: 59
End: 2024-06-21

## 2024-06-24 ENCOUNTER — APPOINTMENT (OUTPATIENT)
Dept: RHEUMATOLOGY | Facility: CLINIC | Age: 59
End: 2024-06-24
Payer: COMMERCIAL

## 2024-06-24 VITALS
HEIGHT: 61 IN | HEART RATE: 84 BPM | WEIGHT: 205 LBS | TEMPERATURE: 98.1 F | BODY MASS INDEX: 38.71 KG/M2 | OXYGEN SATURATION: 98 % | DIASTOLIC BLOOD PRESSURE: 75 MMHG | RESPIRATION RATE: 16 BRPM | SYSTOLIC BLOOD PRESSURE: 116 MMHG

## 2024-06-24 DIAGNOSIS — R26.81 UNSTEADINESS ON FEET: ICD-10-CM

## 2024-06-24 PROCEDURE — 99214 OFFICE O/P EST MOD 30 MIN: CPT

## 2024-06-24 PROCEDURE — G2211 COMPLEX E/M VISIT ADD ON: CPT | Mod: NC

## 2024-06-24 RX ORDER — GABAPENTIN 100 MG/1
100 CAPSULE ORAL
Qty: 90 | Refills: 1 | Status: ACTIVE | COMMUNITY
Start: 2024-06-24 | End: 1900-01-01

## 2024-06-24 RX ORDER — SILDENAFIL 50 MG/1
50 TABLET ORAL
Qty: 60 | Refills: 1 | Status: ACTIVE | COMMUNITY
Start: 2024-06-24 | End: 1900-01-01

## 2024-06-24 RX ORDER — SILDENAFIL 20 MG/1
20 TABLET ORAL
Qty: 90 | Refills: 0 | Status: DISCONTINUED | COMMUNITY
Start: 2024-02-06 | End: 2024-06-24

## 2024-06-24 RX ORDER — NIFEDIPINE 30 MG/1
30 TABLET, EXTENDED RELEASE ORAL DAILY
Qty: 30 | Refills: 2 | Status: ACTIVE | COMMUNITY
Start: 2024-01-10 | End: 1900-01-01

## 2024-06-25 ENCOUNTER — TRANSCRIPTION ENCOUNTER (OUTPATIENT)
Age: 59
End: 2024-06-25

## 2024-06-25 LAB
ALBUMIN SERPL ELPH-MCNC: 4.4 G/DL
ALP BLD-CCNC: 88 U/L
ALT SERPL-CCNC: 15 U/L
ANION GAP SERPL CALC-SCNC: 11 MMOL/L
APPEARANCE: CLEAR
AST SERPL-CCNC: 19 U/L
BASOPHILS # BLD AUTO: 0.06 K/UL
BASOPHILS NFR BLD AUTO: 0.8 %
BILIRUB SERPL-MCNC: 0.5 MG/DL
BILIRUBIN URINE: NEGATIVE
BLOOD URINE: NEGATIVE
BUN SERPL-MCNC: 16 MG/DL
CALCIUM SERPL-MCNC: 9.7 MG/DL
CHLORIDE SERPL-SCNC: 104 MMOL/L
CO2 SERPL-SCNC: 24 MMOL/L
COLOR: YELLOW
CREAT SERPL-MCNC: 0.86 MG/DL
CREAT SPEC-SCNC: 150 MG/DL
CREAT/PROT UR: 0.1 RATIO
CRP SERPL-MCNC: 4 MG/L
DEPRECATED KAPPA LC FREE/LAMBDA SER: 1.03 RATIO
EGFR: 78 ML/MIN/1.73M2
EOSINOPHIL # BLD AUTO: 0.21 K/UL
EOSINOPHIL NFR BLD AUTO: 2.9 %
ERYTHROCYTE [SEDIMENTATION RATE] IN BLOOD BY WESTERGREN METHOD: 30 MM/HR
GLUCOSE QUALITATIVE U: NEGATIVE MG/DL
GLUCOSE SERPL-MCNC: 82 MG/DL
HCT VFR BLD CALC: 45.8 %
HGB BLD-MCNC: 14.4 G/DL
IGA SER QL IEP: 254 MG/DL
IGG SER QL IEP: 913 MG/DL
IGM SER QL IEP: 101 MG/DL
IMM GRANULOCYTES NFR BLD AUTO: 0.1 %
KAPPA LC CSF-MCNC: 1.69 MG/DL
KAPPA LC SERPL-MCNC: 1.74 MG/DL
KETONES URINE: NEGATIVE MG/DL
LEUKOCYTE ESTERASE URINE: NEGATIVE
LYMPHOCYTES # BLD AUTO: 2.73 K/UL
LYMPHOCYTES NFR BLD AUTO: 37.7 %
MAN DIFF?: NORMAL
MCHC RBC-ENTMCNC: 28.7 PG
MCHC RBC-ENTMCNC: 31.4 GM/DL
MCV RBC AUTO: 91.4 FL
MONOCYTES # BLD AUTO: 0.61 K/UL
MONOCYTES NFR BLD AUTO: 8.4 %
NEUTROPHILS # BLD AUTO: 3.63 K/UL
NEUTROPHILS NFR BLD AUTO: 50.1 %
NITRITE URINE: NEGATIVE
PH URINE: 5.5
PLATELET # BLD AUTO: 297 K/UL
POTASSIUM SERPL-SCNC: 3.6 MMOL/L
PROT SERPL-MCNC: 7 G/DL
PROT UR-MCNC: 13 MG/DL
PROTEIN URINE: NEGATIVE MG/DL
RBC # BLD: 5.01 M/UL
RBC # FLD: 15 %
SODIUM SERPL-SCNC: 139 MMOL/L
SPECIFIC GRAVITY URINE: 1.02
UROBILINOGEN URINE: 0.2 MG/DL
WBC # FLD AUTO: 7.25 K/UL

## 2024-08-07 ENCOUNTER — APPOINTMENT (OUTPATIENT)
Dept: RHEUMATOLOGY | Facility: CLINIC | Age: 59
End: 2024-08-07

## 2024-08-07 PROCEDURE — G2211 COMPLEX E/M VISIT ADD ON: CPT | Mod: NC

## 2024-08-07 PROCEDURE — 99214 OFFICE O/P EST MOD 30 MIN: CPT

## 2024-08-07 NOTE — HISTORY OF PRESENT ILLNESS
[___ Month(s) Ago] : [unfilled] month(s) ago [FreeTextEntry1] : Finger wound healing well with hyperbaric oxygen treatment. CT chest w/o evidence of ILD. Normal TTE.  [Currently Experiencing] : currently [Anorexia] : no anorexia [Weight Loss] : no weight loss [Malaise] : no malaise [Fever] : no fever [Chills] : no chills [Fatigue] : fatigue [Depression] : no depression [Malar Facial Rash] : no malar facial rash [Skin Lesions] : no lesions [Skin Nodules] : no skin nodules [Oral Ulcers] : no oral ulcers [Cough] : no cough [Dry Mouth] : dry mouth [Dysphonia] : no dysphonia [Dysphagia] : no dysphagia [Shortness of Breath] : no shortness of breath [Chest Pain] : no chest pain [Arthralgias] : arthralgias [Joint Swelling] : no joint swelling [Joint Warmth] : no joint warmth [Joint Deformity] : no joint deformity [Decreased ROM] : decreased range of motion [Morning Stiffness] : no morning stiffness [Falls] : no falls [Difficulty Standing] : no difficulty standing [Difficulty Walking] : no difficulty walking [Dyspnea] : no dyspnea [Myalgias] : myalgias [Muscle Weakness] : no muscle weakness [Muscle Spasms] : no muscle spasms [Muscle Cramping] : no muscle cramping [Visual Changes] : no visual changes [Eye Pain] : no eye pain [Eye Redness] : no eye redness [Dry Eyes] : no dry eyes

## 2024-08-07 NOTE — ASSESSMENT
[FreeTextEntry1] : 59 year old female with limited systemic sclerosis here for follow up.  1. Limited systemic sclerosis: limited skin tightening, arthralgias/myopathy, Raynaud's with digital ulcer, GERD i) Skin: has limited skin tightening (sclerodactyly) that is relatively stable. 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 Continue Nifedipine ER 30 mg daily and Sildenafil 50 mg BID. ii) Pulmonary: CT chest in Mar 2024 w/o evidence of ILD.   iii) Cardiac: ECHO in 2024 unremarkable.   warren) MSK: Known OA of the knees and feet. No joint symptoms suggestive of RA although has pos RF and +/- CCP. Continue PT for gait training.  v) GI: no active issues vi) Renal: No h/o renal involvement. vii) Heme: no active issues. viii) Neuro: no active issues ix) Vascular: h/o venous insufficiency in lower extremities. Follows with vascular surgery at [place]  2. Bone health: DEXA to be performed  Follow up in 8 weeks

## 2024-08-07 NOTE — DATA REVIEWED
Date: 12/8/2018    Time: 8:07 PM    Patient Placed On BIPAP/CPAP/ Non-Invasive Ventilation? No    If no must comment. Facial area red/color change? No           If YES are Blister/Lesion present? No   If yes must notify nursing staff  BIPAP/CPAP skin barrier?   Yes    Skin barrier type:Liquicel       Comments:12/7/40% pt already on        [employer] [FreeTextEntry1] : Prior imaging studies reviewed and scanned into All [facility]   Arterial Duplex (12/2023): No significant arterial disease in the right arm

## 2024-08-07 NOTE — PHYSICAL EXAM
[General Appearance - Alert] : alert [General Appearance - In No Acute Distress] : in no acute distress [Sclera] : the sclera and conjunctiva were normal [PERRL With Normal Accommodation] : pupils were equal in size, round, and reactive to light [Extraocular Movements] : extraocular movements were intact [Outer Ear] : the ears and nose were normal in appearance [Oropharynx] : the oropharynx was normal [Auscultation Breath Sounds / Voice Sounds] : lungs were clear to auscultation bilaterally [Heart Rate And Rhythm] : heart rate was normal and rhythm regular [Heart Sounds] : normal S1 and S2 [Heart Sounds Gallop] : no gallops [Murmurs] : no murmurs [Heart Sounds Pericardial Friction Rub] : no pericardial rub [Bowel Sounds] : normal bowel sounds [Abdomen Soft] : soft [Abdomen Tenderness] : non-tender [] : no hepato-splenomegaly [Abdomen Mass (___ Cm)] : no abdominal mass palpated [Cervical Lymph Nodes Enlarged Posterior Bilaterally] : posterior cervical [Cervical Lymph Nodes Enlarged Anterior Bilaterally] : anterior cervical [Supraclavicular Lymph Nodes Enlarged Bilaterally] : supraclavicular [Axillary Lymph Nodes Enlarged Bilaterally] : axillary [FreeTextEntry1] : No signs of synovitis, limitation of ROM or deformity in any joint.

## 2024-08-19 ENCOUNTER — RX RENEWAL (OUTPATIENT)
Age: 59
End: 2024-08-19

## 2024-08-22 ENCOUNTER — RX RENEWAL (OUTPATIENT)
Age: 59
End: 2024-08-22

## 2024-09-23 ENCOUNTER — RX RENEWAL (OUTPATIENT)
Age: 59
End: 2024-09-23

## 2024-09-24 ENCOUNTER — RX RENEWAL (OUTPATIENT)
Age: 59
End: 2024-09-24

## 2024-10-08 ENCOUNTER — NON-APPOINTMENT (OUTPATIENT)
Age: 59
End: 2024-10-08

## 2024-10-08 ENCOUNTER — APPOINTMENT (OUTPATIENT)
Dept: RHEUMATOLOGY | Facility: CLINIC | Age: 59
End: 2024-10-08
Payer: COMMERCIAL

## 2024-10-08 VITALS
HEART RATE: 85 BPM | RESPIRATION RATE: 16 BRPM | DIASTOLIC BLOOD PRESSURE: 84 MMHG | HEIGHT: 61 IN | SYSTOLIC BLOOD PRESSURE: 140 MMHG | WEIGHT: 206 LBS | OXYGEN SATURATION: 96 % | BODY MASS INDEX: 38.89 KG/M2

## 2024-10-08 PROCEDURE — 99215 OFFICE O/P EST HI 40 MIN: CPT

## 2024-10-08 PROCEDURE — G2211 COMPLEX E/M VISIT ADD ON: CPT | Mod: NC

## 2024-10-18 ENCOUNTER — RX RENEWAL (OUTPATIENT)
Age: 59
End: 2024-10-18

## 2024-11-20 ENCOUNTER — RX RENEWAL (OUTPATIENT)
Age: 59
End: 2024-11-20

## 2024-11-25 ENCOUNTER — RX RENEWAL (OUTPATIENT)
Age: 59
End: 2024-11-25

## 2024-12-16 ENCOUNTER — APPOINTMENT (OUTPATIENT)
Dept: RHEUMATOLOGY | Facility: CLINIC | Age: 59
End: 2024-12-16
Payer: COMMERCIAL

## 2024-12-16 VITALS
HEIGHT: 61 IN | WEIGHT: 207 LBS | DIASTOLIC BLOOD PRESSURE: 77 MMHG | HEART RATE: 96 BPM | OXYGEN SATURATION: 95 % | BODY MASS INDEX: 39.08 KG/M2 | SYSTOLIC BLOOD PRESSURE: 117 MMHG

## 2024-12-16 DIAGNOSIS — M34.9 SYSTEMIC SCLEROSIS, UNSPECIFIED: ICD-10-CM

## 2024-12-16 PROCEDURE — 99215 OFFICE O/P EST HI 40 MIN: CPT | Mod: GC

## 2024-12-16 PROCEDURE — G2211 COMPLEX E/M VISIT ADD ON: CPT | Mod: NC

## 2024-12-23 ENCOUNTER — RX RENEWAL (OUTPATIENT)
Age: 59
End: 2024-12-23

## 2025-02-11 ENCOUNTER — RX RENEWAL (OUTPATIENT)
Age: 60
End: 2025-02-11

## 2025-02-24 ENCOUNTER — NON-APPOINTMENT (OUTPATIENT)
Age: 60
End: 2025-02-24

## 2025-02-24 ENCOUNTER — APPOINTMENT (OUTPATIENT)
Dept: RHEUMATOLOGY | Facility: CLINIC | Age: 60
End: 2025-02-24
Payer: COMMERCIAL

## 2025-02-24 ENCOUNTER — LABORATORY RESULT (OUTPATIENT)
Age: 60
End: 2025-02-24

## 2025-02-24 VITALS
OXYGEN SATURATION: 97 % | HEIGHT: 61 IN | BODY MASS INDEX: 39.27 KG/M2 | WEIGHT: 208 LBS | SYSTOLIC BLOOD PRESSURE: 129 MMHG | RESPIRATION RATE: 16 BRPM | DIASTOLIC BLOOD PRESSURE: 82 MMHG | HEART RATE: 87 BPM

## 2025-02-24 PROCEDURE — G2211 COMPLEX E/M VISIT ADD ON: CPT | Mod: NC

## 2025-02-24 PROCEDURE — 99215 OFFICE O/P EST HI 40 MIN: CPT

## 2025-02-28 LAB
ALBUMIN SERPL ELPH-MCNC: 4.4 G/DL
ALP BLD-CCNC: 122 U/L
ALT SERPL-CCNC: 20 U/L
ANION GAP SERPL CALC-SCNC: 12 MMOL/L
APPEARANCE: CLEAR
AST SERPL-CCNC: 18 U/L
BASOPHILS # BLD AUTO: 0.04 K/UL
BASOPHILS NFR BLD AUTO: 0.6 %
BILIRUB SERPL-MCNC: 0.6 MG/DL
BILIRUBIN URINE: NEGATIVE
BLOOD URINE: NEGATIVE
BUN SERPL-MCNC: 17 MG/DL
CALCIUM SERPL-MCNC: 9.9 MG/DL
CHLORIDE SERPL-SCNC: 105 MMOL/L
CK SERPL-CCNC: 43 U/L
CO2 SERPL-SCNC: 24 MMOL/L
COLOR: YELLOW
CREAT SERPL-MCNC: 0.71 MG/DL
CREAT SPEC-SCNC: 123 MG/DL
CREAT/PROT UR: 0.1 RATIO
CRP SERPL-MCNC: 3 MG/L
DEPRECATED KAPPA LC FREE/LAMBDA SER: 1.15 RATIO
EGFR: 98 ML/MIN/1.73M2
EOSINOPHIL # BLD AUTO: 0.23 K/UL
EOSINOPHIL NFR BLD AUTO: 3.5 %
ERYTHROCYTE [SEDIMENTATION RATE] IN BLOOD BY WESTERGREN METHOD: 44 MM/HR
GLUCOSE QUALITATIVE U: NEGATIVE MG/DL
GLUCOSE SERPL-MCNC: 81 MG/DL
HCT VFR BLD CALC: 44.5 %
HGB BLD-MCNC: 14 G/DL
IGA SER QL IEP: 264 MG/DL
IGG SER QL IEP: 993 MG/DL
IGM SER QL IEP: 119 MG/DL
IMM GRANULOCYTES NFR BLD AUTO: 0.2 %
KAPPA LC CSF-MCNC: 1.75 MG/DL
KAPPA LC SERPL-MCNC: 2.01 MG/DL
KETONES URINE: NEGATIVE MG/DL
LEUKOCYTE ESTERASE URINE: ABNORMAL
LYMPHOCYTES # BLD AUTO: 2.78 K/UL
LYMPHOCYTES NFR BLD AUTO: 42.7 %
MAN DIFF?: NORMAL
MCHC RBC-ENTMCNC: 26.7 PG
MCHC RBC-ENTMCNC: 31.5 G/DL
MCV RBC AUTO: 84.9 FL
MONOCYTES # BLD AUTO: 0.43 K/UL
MONOCYTES NFR BLD AUTO: 6.6 %
NEUTROPHILS # BLD AUTO: 3.02 K/UL
NEUTROPHILS NFR BLD AUTO: 46.4 %
NITRITE URINE: NEGATIVE
PH URINE: 5.5
PLATELET # BLD AUTO: 313 K/UL
POTASSIUM SERPL-SCNC: 4.5 MMOL/L
PROT SERPL-MCNC: 7 G/DL
PROT UR-MCNC: 14 MG/DL
PROTEIN URINE: NEGATIVE MG/DL
RBC # BLD: 5.24 M/UL
RBC # FLD: 16.2 %
SODIUM SERPL-SCNC: 141 MMOL/L
SPECIFIC GRAVITY URINE: 1.02
UROBILINOGEN URINE: 0.2 MG/DL
WBC # FLD AUTO: 6.51 K/UL

## 2025-03-11 ENCOUNTER — APPOINTMENT (OUTPATIENT)
Dept: PULMONOLOGY | Facility: CLINIC | Age: 60
End: 2025-03-11

## 2025-03-11 VITALS
OXYGEN SATURATION: 59 % | WEIGHT: 213 LBS | BODY MASS INDEX: 37.74 KG/M2 | HEIGHT: 63 IN | RESPIRATION RATE: 16 BRPM | TEMPERATURE: 97.8 F | SYSTOLIC BLOOD PRESSURE: 127 MMHG | HEART RATE: 101 BPM | DIASTOLIC BLOOD PRESSURE: 83 MMHG

## 2025-03-11 DIAGNOSIS — M34.9 SYSTEMIC SCLEROSIS, UNSPECIFIED: ICD-10-CM

## 2025-03-11 PROCEDURE — 99204 OFFICE O/P NEW MOD 45 MIN: CPT

## 2025-03-18 ENCOUNTER — RX RENEWAL (OUTPATIENT)
Age: 60
End: 2025-03-18

## 2025-03-25 ENCOUNTER — RX RENEWAL (OUTPATIENT)
Age: 60
End: 2025-03-25

## 2025-04-16 ENCOUNTER — RX RENEWAL (OUTPATIENT)
Age: 60
End: 2025-04-16

## 2025-04-25 ENCOUNTER — APPOINTMENT (OUTPATIENT)
Dept: PULMONOLOGY | Facility: CLINIC | Age: 60
End: 2025-04-25
Payer: COMMERCIAL

## 2025-04-25 VITALS
SYSTOLIC BLOOD PRESSURE: 123 MMHG | DIASTOLIC BLOOD PRESSURE: 79 MMHG | BODY MASS INDEX: 41.54 KG/M2 | HEART RATE: 87 BPM | WEIGHT: 220 LBS | HEIGHT: 61 IN

## 2025-04-25 PROCEDURE — 94729 DIFFUSING CAPACITY: CPT

## 2025-04-25 PROCEDURE — 94010 BREATHING CAPACITY TEST: CPT

## 2025-04-25 PROCEDURE — 94726 PLETHYSMOGRAPHY LUNG VOLUMES: CPT

## 2025-04-28 ENCOUNTER — APPOINTMENT (OUTPATIENT)
Dept: RHEUMATOLOGY | Facility: CLINIC | Age: 60
End: 2025-04-28
Payer: COMMERCIAL

## 2025-04-28 VITALS
DIASTOLIC BLOOD PRESSURE: 81 MMHG | OXYGEN SATURATION: 98 % | SYSTOLIC BLOOD PRESSURE: 128 MMHG | HEART RATE: 88 BPM | HEIGHT: 61 IN | BODY MASS INDEX: 39.27 KG/M2 | WEIGHT: 208 LBS

## 2025-04-28 PROCEDURE — 99215 OFFICE O/P EST HI 40 MIN: CPT

## 2025-04-28 PROCEDURE — G2211 COMPLEX E/M VISIT ADD ON: CPT | Mod: NC

## 2025-09-15 ENCOUNTER — RX RENEWAL (OUTPATIENT)
Age: 60
End: 2025-09-15